# Patient Record
(demographics unavailable — no encounter records)

---

## 2023-10-23 DIAGNOSIS — Z82.79 FAMILY HISTORY OF CONGENITAL HEART DEFECT: Primary | ICD-10-CM

## 2023-10-23 NOTE — PROGRESS NOTES
Marcell consented to genetic test for parental analysis for exome sequencing for his daughter Sandra (MRN [id number]).     Buccal sample was collected in clinic today.    Ann Ramirez MS, [facility]  Licensed Genetic Counselor  [facility] [place]  Phone: [phone]  Fax: [phone]

## 2024-08-14 LAB
SIGNIFICANT RESULTS: NORMAL
SPECIMEN DESCRIPTION: NORMAL